# Patient Record
Sex: MALE | Race: WHITE | NOT HISPANIC OR LATINO | Employment: PART TIME | URBAN - METROPOLITAN AREA
[De-identification: names, ages, dates, MRNs, and addresses within clinical notes are randomized per-mention and may not be internally consistent; named-entity substitution may affect disease eponyms.]

---

## 2022-07-20 ENCOUNTER — TRANSFERRED RECORDS (OUTPATIENT)
Dept: HEALTH INFORMATION MANAGEMENT | Facility: CLINIC | Age: 52
End: 2022-07-20

## 2022-07-21 ENCOUNTER — TRANSFERRED RECORDS (OUTPATIENT)
Dept: HEALTH INFORMATION MANAGEMENT | Facility: CLINIC | Age: 52
End: 2022-07-21

## 2022-07-25 ENCOUNTER — TRANSFERRED RECORDS (OUTPATIENT)
Dept: HEALTH INFORMATION MANAGEMENT | Facility: CLINIC | Age: 52
End: 2022-07-25

## 2022-07-25 LAB
ALT SERPL-CCNC: 18 U/L (ref 4–50)
AST SERPL-CCNC: 20 U/L (ref 12–35)
CREATININE (EXTERNAL): 1.3 MG/DL (ref 0.5–1.5)
GLUCOSE (EXTERNAL): 99 MG/DL (ref 60–115)
POTASSIUM (EXTERNAL): 5.4 MMOL/L (ref 3.6–5.1)

## 2022-09-21 ENCOUNTER — TRANSFERRED RECORDS (OUTPATIENT)
Dept: HEALTH INFORMATION MANAGEMENT | Facility: CLINIC | Age: 52
End: 2022-09-21

## 2022-09-21 ENCOUNTER — MEDICAL CORRESPONDENCE (OUTPATIENT)
Dept: HEALTH INFORMATION MANAGEMENT | Facility: CLINIC | Age: 52
End: 2022-09-21

## 2022-09-26 ENCOUNTER — TRANSCRIBE ORDERS (OUTPATIENT)
Dept: OTHER | Age: 52
End: 2022-09-26

## 2022-09-26 DIAGNOSIS — R59.9 ENLARGED LYMPH NODES, UNSPECIFIED: Primary | ICD-10-CM

## 2022-11-10 ENCOUNTER — TRANSFERRED RECORDS (OUTPATIENT)
Dept: HEALTH INFORMATION MANAGEMENT | Facility: CLINIC | Age: 52
End: 2022-11-10

## 2022-11-14 ENCOUNTER — TRANSFERRED RECORDS (OUTPATIENT)
Dept: HEALTH INFORMATION MANAGEMENT | Facility: CLINIC | Age: 52
End: 2022-11-14

## 2023-01-01 ENCOUNTER — PRE VISIT (OUTPATIENT)
Dept: OTOLARYNGOLOGY | Facility: CLINIC | Age: 53
End: 2023-01-01

## 2024-07-22 NOTE — TELEPHONE ENCOUNTER
FUTURE VISIT INFORMATION      FUTURE VISIT INFORMATION:    Date: 1/31/23    Time: 1pm    [place] [facility] [place]FORMATION:    Referring provider:  David Anderson MD     Referring providers clinic:  [facility]    Reason for visit/diagnosis  Referral For Vocal Cord Injection consult. appt per wife, ref by David Anderson MD [facility] [place]    RECORDS REQUESTED FROM:       Clinic name Comments Records Status Imaging Status   [facility]  David Anderson MD     PET- 7/21/22  Pet- 11/10/22  Pet- 2020  CT Chest - 2020  MRI Brain- 2020 Scanned in epic   PACS   [facility] 11/17/22- note from José Miguel Burgos MD Care everywhere                               11/17/22 11:34am- Faxed request for records to be fax to us- Jessica   11/21/22 1:16pm - received record from [facility] and sent it to scan- Jessica   11/21/22 1:39pm- called [facility] to push image to [facility] and will called [facility] in a bit here to push image to [place] pacs- Jessica    11/21/22 2:03pm - called [facility] to push image to [facility] PACS- [place]   11/21/22 2:30pm - received images in pacs and attached it to pt - Jessica    KELSEY Davis